# Patient Record
Sex: MALE | Race: WHITE | NOT HISPANIC OR LATINO | Employment: FULL TIME | URBAN - METROPOLITAN AREA
[De-identification: names, ages, dates, MRNs, and addresses within clinical notes are randomized per-mention and may not be internally consistent; named-entity substitution may affect disease eponyms.]

---

## 2023-04-01 ENCOUNTER — HOSPITAL ENCOUNTER (EMERGENCY)
Facility: HOSPITAL | Age: 58
Discharge: HOME | End: 2023-04-01
Attending: EMERGENCY MEDICINE | Admitting: EMERGENCY MEDICINE
Payer: COMMERCIAL

## 2023-04-01 ENCOUNTER — APPOINTMENT (EMERGENCY)
Dept: RADIOLOGY | Facility: HOSPITAL | Age: 58
End: 2023-04-01
Payer: COMMERCIAL

## 2023-04-01 VITALS
SYSTOLIC BLOOD PRESSURE: 137 MMHG | HEART RATE: 80 BPM | OXYGEN SATURATION: 100 % | DIASTOLIC BLOOD PRESSURE: 75 MMHG | TEMPERATURE: 98.7 F | RESPIRATION RATE: 15 BRPM

## 2023-04-01 DIAGNOSIS — R51.9 ACUTE NONINTRACTABLE HEADACHE, UNSPECIFIED HEADACHE TYPE: Primary | ICD-10-CM

## 2023-04-01 DIAGNOSIS — B02.9 HERPES ZOSTER WITHOUT COMPLICATION: ICD-10-CM

## 2023-04-01 LAB
ALBUMIN SERPL-MCNC: 4.1 G/DL (ref 3.4–5)
ALP SERPL-CCNC: 44 IU/L (ref 35–126)
ALT SERPL-CCNC: 27 IU/L (ref 16–63)
ANION GAP SERPL CALC-SCNC: 6 MEQ/L (ref 3–15)
AST SERPL-CCNC: 28 IU/L (ref 15–41)
BASOPHILS # BLD: 0.02 K/UL (ref 0.01–0.1)
BASOPHILS NFR BLD: 0.4 %
BILIRUB SERPL-MCNC: 0.7 MG/DL (ref 0.3–1.2)
BUN SERPL-MCNC: 12 MG/DL (ref 8–20)
CALCIUM SERPL-MCNC: 9.1 MG/DL (ref 8.9–10.3)
CHLORIDE SERPL-SCNC: 105 MEQ/L (ref 98–109)
CO2 SERPL-SCNC: 26 MEQ/L (ref 22–32)
CREAT SERPL-MCNC: 1 MG/DL (ref 0.8–1.3)
DIFFERENTIAL METHOD BLD: ABNORMAL
EOSINOPHIL # BLD: 0.06 K/UL (ref 0.04–0.54)
EOSINOPHIL NFR BLD: 1.1 %
ERYTHROCYTE [DISTWIDTH] IN BLOOD BY AUTOMATED COUNT: 12 % (ref 11.6–14.4)
GFR SERPL CREATININE-BSD FRML MDRD: >60 ML/MIN/1.73M*2
GLUCOSE SERPL-MCNC: 85 MG/DL (ref 70–99)
HCT VFR BLDCO AUTO: 44.3 % (ref 40.1–51)
HGB BLD-MCNC: 15.2 G/DL (ref 13.7–17.5)
IMM GRANULOCYTES # BLD AUTO: 0.01 K/UL (ref 0–0.08)
IMM GRANULOCYTES NFR BLD AUTO: 0.2 %
LYMPHOCYTES # BLD: 0.77 K/UL (ref 1.2–3.5)
LYMPHOCYTES NFR BLD: 14.4 %
MCH RBC QN AUTO: 31.1 PG (ref 28–33.2)
MCHC RBC AUTO-ENTMCNC: 34.3 G/DL (ref 32.2–36.5)
MCV RBC AUTO: 90.8 FL (ref 83–98)
MONOCYTES # BLD: 0.6 K/UL (ref 0.3–1)
MONOCYTES NFR BLD: 11.2 %
NEUTROPHILS # BLD: 3.89 K/UL (ref 1.7–7)
NEUTS SEG NFR BLD: 72.7 %
NRBC BLD-RTO: 0 %
PDW BLD AUTO: 8.9 FL (ref 9.4–12.4)
PLATELET # BLD AUTO: 192 K/UL (ref 150–350)
POTASSIUM SERPL-SCNC: 4.3 MEQ/L (ref 3.6–5.1)
PROT SERPL-MCNC: 7.1 G/DL (ref 6–8.2)
RBC # BLD AUTO: 4.88 M/UL (ref 4.5–5.8)
SODIUM SERPL-SCNC: 137 MEQ/L (ref 136–144)
WBC # BLD AUTO: 5.35 K/UL (ref 3.8–10.5)

## 2023-04-01 PROCEDURE — 70450 CT HEAD/BRAIN W/O DYE: CPT | Mod: MG

## 2023-04-01 PROCEDURE — 99284 EMERGENCY DEPT VISIT MOD MDM: CPT | Mod: 25

## 2023-04-01 PROCEDURE — 85025 COMPLETE CBC W/AUTO DIFF WBC: CPT | Performed by: EMERGENCY MEDICINE

## 2023-04-01 PROCEDURE — 80053 COMPREHEN METABOLIC PANEL: CPT | Performed by: EMERGENCY MEDICINE

## 2023-04-01 PROCEDURE — 36415 COLL VENOUS BLD VENIPUNCTURE: CPT | Performed by: EMERGENCY MEDICINE

## 2023-04-01 RX ORDER — OXYCODONE AND ACETAMINOPHEN 5; 325 MG/1; MG/1
1 TABLET ORAL EVERY 6 HOURS PRN
Qty: 12 TABLET | Refills: 0 | Status: SHIPPED | OUTPATIENT
Start: 2023-04-01 | End: 2023-04-11

## 2023-04-01 ASSESSMENT — ENCOUNTER SYMPTOMS
WHEEZING: 0
DIZZINESS: 0
CHEST TIGHTNESS: 0
COLOR CHANGE: 0
NECK PAIN: 0
SORE THROAT: 0
VOMITING: 0
CHILLS: 0
ABDOMINAL PAIN: 0
ABDOMINAL DISTENTION: 0
BACK PAIN: 0
FEVER: 0
DIARRHEA: 0
LIGHT-HEADEDNESS: 0
WEAKNESS: 0
SEIZURES: 0
NECK STIFFNESS: 0
PSYCHIATRIC NEGATIVE: 1
SPEECH DIFFICULTY: 0
NAUSEA: 0
COUGH: 0
ARTHRALGIAS: 0
TROUBLE SWALLOWING: 0
SHORTNESS OF BREATH: 0
NUMBNESS: 0
AGITATION: 0
PALPITATIONS: 0
HEADACHES: 1

## 2023-04-01 NOTE — ED ATTESTATION NOTE
I have personally seen and examined the patient.  I reviewed and agree with physician assistant / nurse practitioner’s assessment and plan of care.       Exam: Patient is uncomfortable but stable in no acute distress.  His vital signs are normal and he is afebrile.  Heart is regular and there is no respiratory difficulty.  Skin exam is consistent with a thoracic and back rash only on the right side diagnostic for shingles eruption.  Although I did not appreciate a rash on the scalp with the symptoms certainly suggest the lancinating pain is due to potential secondary site that will ultimately demonstrate rash.  Fluorescein staining did not reveal any dendritic lesions in the right eye and there is no evidence for Wall sign.    Plan: Patient is currently on antivirals.  We will treat the patient's pain and check a head CT to rule out intracranial pathology as a cause for his right-sided head and scalp discomfort         Kip Barker,   04/01/23 1242

## 2023-04-01 NOTE — DISCHARGE INSTRUCTIONS
Please call follow-up with your primary care doctor at next available appointment.  Take the antivirals as previously prescribed and take the additional pain medication as needed for pain.  Return here right away if your symptoms were to change get worse or any other concerns.

## 2023-04-01 NOTE — ED PROVIDER NOTES
Emergency Medicine Note  HPI   HISTORY OF PRESENT ILLNESS     HPI   Patient is a 57-year-old male with no real pertinent past medical history that is present emergency room today due to probable shingles with a severe headache.  The patient tells me that he is here working on a bridge and reportedly noticed a little red area and soreness to his right axilla he thought it was just from his harness that he wears reportedly the rashes spread he did a TeleMed appointment with his primary care doctor in [place] and was told he probably had shingles he reports that he started antivirals he has had about 4 doses so far now he is having pain to his right temple area denies any visual changes does not get headaches very frequently but the headache is very sharp and pulsating at times.  No reported trauma patient was concerned so present emergency room for evaluation.        Patient History   PAST HISTORY     Reviewed from Nursing Triage:       History reviewed. No pertinent past medical history.    Past Surgical History:   Procedure Laterality Date   • HERNIA REPAIR         History reviewed. No pertinent family history.    Social History     Tobacco Use   • Smoking status: Never   • Smokeless tobacco: Never   Substance Use Topics   • Alcohol use: Never   • Drug use: Never         Review of Systems   REVIEW OF SYSTEMS     Review of Systems   Constitutional: Negative for chills and fever.   HENT: Negative for sore throat and trouble swallowing.    Respiratory: Negative for cough, chest tightness, shortness of breath and wheezing.    Cardiovascular: Negative for chest pain and palpitations.   Gastrointestinal: Negative for abdominal distention, abdominal pain, diarrhea, nausea and vomiting.   Musculoskeletal: Negative for arthralgias, back pain, neck pain and neck stiffness.   Skin: Positive for rash. Negative for color change.   Neurological: Positive for headaches. Negative for dizziness, seizures, syncope, speech difficulty,  weakness, light-headedness and numbness.   Psychiatric/Behavioral: Negative.  Negative for agitation.   All other systems reviewed and are negative.        VITALS     ED Vitals    Date/Time Temp Pulse Resp BP SpO2 [facility]   04/01/23 1425 -- 80 15 137/75 100 % LMS   04/01/23 1158 37.1 °C (98.7 °F) 86 16 140/88 98 % PMA        Pulse Ox %: 98 % (04/01/23 1334)  Pulse Ox Interpretation: Normal (04/01/23 1334)  Heart Rate: 86 (04/01/23 1334)  Rhythm Strip Interpretation: Normal Sinus Rhythm (04/01/23 1334)     Physical Exam   PHYSICAL EXAM     Physical Exam  Vitals and nursing note reviewed.   Constitutional:       Appearance: Normal appearance.   HENT:      Head: Normocephalic and atraumatic.   Eyes:      Extraocular Movements: Extraocular movements intact.      Pupils: Pupils are equal, round, and reactive to light.      Comments: Fluorescein exam does not reveal any evidence of dendritic lesion on the right.  There is no uptake   Cardiovascular:      Rate and Rhythm: Normal rate and regular rhythm.      Pulses: Normal pulses.      Heart sounds: Normal heart sounds.   Pulmonary:      Effort: Pulmonary effort is normal.      Breath sounds: Normal breath sounds. No wheezing, rhonchi or rales.   Skin:     General: Skin is warm and dry.      Findings: Rash (Patient with pustular rash to his right upper back does not cross the midline over into his right lateral chest wall and right upper anterior chest wall consistent with shingles.  He also has what appears to be a very faint red rash to his right temple are) present.   Neurological:      General: No focal deficit present.      Mental Status: He is alert.           PROCEDURES     Procedures     DATA     Results     Procedure Component Value Units Date/Time    Comprehensive metabolic panel [[id number]]  (Normal) Collected: 04/01/23 1210    Specimen: Blood, Venous Updated: 04/01/23 1251     Sodium 137 mEQ/L      Potassium 4.3 mEQ/L      Comment: Results obtained on plasma.  Plasma Potassium values may be up to 0.4 mEQ/L less than serum values. The differences may be greater for patients with high platelet or white cell counts.        Chloride 105 mEQ/L      CO2 26 mEQ/L      BUN 12 mg/dL      Creatinine 1.0 mg/dL      Glucose 85 mg/dL      Calcium 9.1 mg/dL      AST (SGOT) 28 IU/L      ALT (SGPT) 27 IU/L      Alkaline Phosphatase 44 IU/L      Total Protein 7.1 g/dL      Comment: Test performed on plasma which typically contains approximately 0.4 g/dL more protein than serum.        Albumin 4.1 g/dL      Bilirubin, Total 0.7 mg/dL      eGFR >60.0 mL/min/1.73m*2      Anion Gap 6 mEQ/L     CBC and differential [[id number]]  (Abnormal) Collected: 04/01/23 1210    Specimen: Blood, Venous Updated: 04/01/23 1225     WBC 5.35 K/uL      RBC 4.88 M/uL      Hemoglobin 15.2 g/dL      Hematocrit 44.3 %      MCV 90.8 fL      MCH 31.1 pg      MCHC 34.3 g/dL      RDW 12.0 %      Platelets 192 K/uL      MPV 8.9 fL      Differential Type Auto     nRBC 0.0 %      Immature Granulocytes 0.2 %      Neutrophils 72.7 %      Lymphocytes 14.4 %      Monocytes 11.2 %      Eosinophils 1.1 %      Basophils 0.4 %      Immature Granulocytes, Absolute 0.01 K/uL      Neutrophils, Absolute 3.89 K/uL      Lymphocytes, Absolute 0.77 K/uL      Monocytes, Absolute 0.60 K/uL      Eosinophils, Absolute 0.06 K/uL      Basophils, Absolute 0.02 K/uL           Imaging Results          CT HEAD WITHOUT IV CONTRAST (Final result)  Result time 04/01/23 14:52:52    Final result                 Impression:    IMPRESSION: No acute intracranial abnormality             Narrative:    CLINICAL HISTORY: Headache.    COMMENT:    Noncontrast CT of the brain was performed. Images were reviewed in routine  brain, stroke, subdural, and bony windows.  Sagittal and coronal reformatted  sequences were reviewed.  The kV and/or mA were adjusted according to the  patient's size and body part for CT dose reduction.    There is no evidence of acute  territorial infarct.  There is no evidence of  bleed, mass effect, or midline shift.  There is no extra axial mass or fluid  collection. There is mild mucosal thickening in the sphenoid sinus.                                No orders to display       Scoring tools                                  ED Course & MDM   MDM / ED COURSE / CLINICAL IMPRESSION / DISPO     MDM    ED Course as of 04/01/23 1950   Sat Apr 01, 2023 1949 Patient CT does not show anything acute.  Patient has shingles no eye involvement will be discharged home with follow-up. [TK]      ED Course User Index  [TK] Misael Wagner PA C     Clinical Impression      Acute nonintractable headache, unspecified headache type   Herpes zoster without complication     _________________     ED Disposition   Discharge                   Misael Wagner PA C  04/01/23 1950

## 2024-02-12 ENCOUNTER — NON-APPOINTMENT (OUTPATIENT)
Age: 59
End: 2024-02-12

## 2024-08-25 ENCOUNTER — HOSPITAL ENCOUNTER (EMERGENCY)
Facility: HOSPITAL | Age: 59
Discharge: HOME | End: 2024-08-25
Attending: EMERGENCY MEDICINE | Admitting: EMERGENCY MEDICINE

## 2024-08-25 ENCOUNTER — APPOINTMENT (EMERGENCY)
Dept: RADIOLOGY | Facility: HOSPITAL | Age: 59
End: 2024-08-25

## 2024-08-25 VITALS
HEART RATE: 80 BPM | SYSTOLIC BLOOD PRESSURE: 130 MMHG | OXYGEN SATURATION: 98 % | HEIGHT: 69 IN | RESPIRATION RATE: 15 BRPM | BODY MASS INDEX: 27.4 KG/M2 | DIASTOLIC BLOOD PRESSURE: 80 MMHG | WEIGHT: 185 LBS | TEMPERATURE: 98 F

## 2024-08-25 DIAGNOSIS — S60.459A FOREIGN BODY IN SKIN OF FINGER, INITIAL ENCOUNTER: Primary | ICD-10-CM

## 2024-08-25 PROCEDURE — 99283 EMERGENCY DEPT VISIT LOW MDM: CPT | Mod: 25

## 2024-08-25 PROCEDURE — 10120 INC&RMVL FB SUBQ TISS SMPL: CPT

## 2024-08-25 PROCEDURE — 0JCJ0ZZ EXTIRPATION OF MATTER FROM RIGHT HAND SUBCUTANEOUS TISSUE AND FASCIA, OPEN APPROACH: ICD-10-PCS | Performed by: EMERGENCY MEDICINE

## 2024-08-25 PROCEDURE — 63700000 HC SELF-ADMINISTRABLE DRUG

## 2024-08-25 PROCEDURE — 73140 X-RAY EXAM OF FINGER(S): CPT | Mod: RT

## 2024-08-25 RX ORDER — CEPHALEXIN 500 MG/1
500 CAPSULE ORAL 4 TIMES DAILY
Qty: 27 CAPSULE | Refills: 0 | Status: SHIPPED | OUTPATIENT
Start: 2024-08-25 | End: 2024-09-01

## 2024-08-25 RX ORDER — CEPHALEXIN 500 MG/1
500 CAPSULE ORAL ONCE
Status: COMPLETED | OUTPATIENT
Start: 2024-08-25 | End: 2024-08-25

## 2024-08-25 RX ADMIN — CEPHALEXIN 500 MG: 500 CAPSULE ORAL at 18:06

## 2024-08-25 ASSESSMENT — ENCOUNTER SYMPTOMS
CHILLS: 0
WOUND: 1
WEAKNESS: 0
NUMBNESS: 0
FEVER: 0

## 2024-08-25 NOTE — ED PROVIDER NOTES
Emergency Medicine Note  HPI   HISTORY OF PRESENT ILLNESS     58-year-old male with no significant past medical history who presents for evaluation of right splinter in his right ring finger while working outside yesterday.  States he was trying to sweep rocks off while cleaning off a trailer when a splinter pierced his right fourth digit.  Was able to remove some of the splinter but thinks there is still a piece in there.  Unsure of last tetanus.  Denies fevers, chills, or drainage from the finger.          Patient History   PAST HISTORY     Reviewed from Nursing Triage:       History reviewed. No pertinent past medical history.    Past Surgical History:   Procedure Laterality Date    HERNIA REPAIR         History reviewed. No pertinent family history.    Social History     Tobacco Use    Smoking status: Never    Smokeless tobacco: Never   Substance Use Topics    Alcohol use: Never    Drug use: Never         Review of Systems   REVIEW OF SYSTEMS     Review of Systems   Constitutional:  Negative for chills and fever.   Skin:  Positive for wound.   Neurological:  Negative for weakness and numbness.         VITALS     ED Vitals      Date/Time Temp Pulse Resp BP SpO2 [facility]   08/25/24 1806 36.7 °C (98 °F) 80 15 130/80 98 % SDN   08/25/24 1334 36.7 °C (98 °F) 85 18 134/82 95 % MB          Pulse Ox %: 95 % (08/25/24 1405)  Pulse Ox Interpretation: Normal (08/25/24 1405)           Physical Exam   PHYSICAL EXAM     Physical Exam  Constitutional:       General: He is not in acute distress.     Appearance: He is not ill-appearing, toxic-appearing or diaphoretic.   Cardiovascular:      Rate and Rhythm: Normal rate.   Pulmonary:      Effort: Pulmonary effort is normal. No respiratory distress.   Musculoskeletal:      Comments: Right ring finger with erythema and swelling over the anterior DIP joint.  Small puncture wound right medial fourth digit at the distal phalanx.  Subcutaneous foreign body noted.  Pain with DIP range of  motion.   Neurological:      Mental Status: He is alert.           PROCEDURES     Procedures     DATA     Results       None            Imaging Results              X-RAY FINGER RIGHT 2+ VIEWS (Final result)  Result time 08/25/24 15:14:40      Final result                   Impression:    IMPRESSION: No acute fracture or dislocation in the right fourth digit.  No  obvious foreign body.               Narrative:    CLINICAL HISTORY: Right ring finger splinter.  Evaluate for retained body.    COMPARISON:  Four views of the right hand fourth digit was obtained.    TECHNIQUE:  Four views of the right hand was obtained.  Attention to the right  fourth digit was obtained.    FINDINGS:  No acute fracture or dislocation in the right fourth digit.  There is  degenerative osseous spurring noted adjacent to the right radial aspect of the  fourth digit distal interphalangeal joint.  No soft tissue abnormality.  No  obvious foreign body.                                      No orders to display       Scoring tools                                  ED Course & MDM   MDM / ED COURSE / CLINICAL IMPRESSION / DISPO     Medical Decision Making  58-year-old male with no significant past medical history who presents for evaluation of right splinter in his right ring finger while working outside yesterday.  Removed some of it but still thinks he has a piece in there.  On exam he has right ring finger with erythema and swelling over the anterior DIP joint.  Small puncture wound right medial fourth digit at the distal phalanx.  Subcutaneous foreign body noted.  Pain with DIP range of motion.  X-rays negative for acute fracture, dislocation, or obvious foreign body.  Orthopedic resident on call consulted, they irrigated the wound, made an incision, and removed multiple pieces of the splinter.  The patient was given hand surgery follow-up with Dr. Wheatley in 1 week, Keflex 500 mg 4 times daily x 7 days, and instructions to keep the wound clean  and dry for 2 days.  Vital signs stable, patient agreeable to plan.    Problems Addressed:  Foreign body in skin of finger, initial encounter: acute illness or injury    Amount and/or Complexity of Data Reviewed  Radiology: ordered. Decision-making details documented in ED Course.    Risk  Prescription drug management.        ED Course as of 08/25/24 2242   Sun Aug 25, 2024   1357 Patient refusing tetanus shot. [CM]   1534 X-RAY FINGER RIGHT 2+ VIEWS  IMPRESSION: No acute fracture or dislocation in the right fourth digit.  No  obvious foreign body.   [CM]   1636 Ortho paged [CM]   1645 Ortho repaged now that patient is in room, will come to evaluate patient [CM]      ED Course User Index  [CM] Shakir Henry PA C     Clinical Impression      Foreign body in skin of finger, initial encounter     _________________       ED Disposition   Discharge                       Shakir Henry PA C  08/25/24 2245

## 2024-08-25 NOTE — DISCHARGE INSTRUCTIONS
Take Keflex 1 tablet 500 mg every 6 hours to help treat and prevent infection.  Take Tylenol and Motrin as needed for pain.  Follow-up with the orthopedic hand surgeon in 1 week.  Return to the emergency department with any new, worsening, or other concerning symptoms.

## 2024-08-25 NOTE — ED ATTESTATION NOTE
I have personally seen and examined the patient.  I reviewed and agree with physician assistant / nurse practitioner’s assessment and plan of care, with the following exceptions: None    I personally performed the key components of the encounter and provided a substantive portion of the care and medical decision making    My examination, assessment, and plan of care of Conor Cortez is as follows:     PT felt a wooden splinter go in his right ring finger. He pulled a small piece out of the exit wound.  Exam: right ring finger: entrance wound to mid phal flexor surface with another wound lateral to the nail. Full ROM with some pain     Manoj Cabello MD  08/25/24 4544

## 2024-08-25 NOTE — CONSULTS
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 Orthopaedic surgery was consulted for potential foreign body.      On direct questioning, the patient reports yesterday he was cleaning a trailer with his gloved right hand when a wooden splinter pierced his fourth digit. He removed part of a wooden piece off the radial aspect of his distal phalanx of his fourth digit but today he noticed pain and swelling so he came to the ED for further evaluation. No fevers chills or drainage from finger.     Hand dominance: RHD   Baseline Ambulatory Status: unassisted ambulator    PMH:   History reviewed. No pertinent past medical history.    PSH:   Past Surgical History:   Procedure Laterality Date    HERNIA REPAIR         Medications:   Not in a hospital admission.    Allergies:   No Known Allergies    SHx:  - Living situation: home  - Occupation: industrial    - Tobacco use: denies   - Alcohol use: denies   - Illicit substances: denies     Vital Signs:  Vitals:    08/25/24 1334   BP: 134/82   Pulse: 85   Resp: 18   Temp: 36.7 °C (98 °F)   SpO2: 95%       Labs:  CBC Results         04/01/23     1210    WBC 5.35    RBC 4.88    HGB 15.2    HCT 44.3    MCV 90.8    MCH 31.1    MCHC 34.3              BMP Results         04/01/23     1210        K 4.3    Cl 105    CO2 26    Glucose 85    BUN 12    Creatinine 1.0    Calcium 9.1    Anion Gap 6    EGFR >60.0           Comment for K at 1210 on 04/01/23: Results obtained on plasma. Plasma Potassium values may be up to 0.4 mEQ/L less than serum values. The differences may be greater for patients with high platelet or white cell counts.            Physical Exam:  General:  Awake, following commands  Symmetric chest rise bilaterally with normal  effort    Musculoskeletal:  Right Upper Extremity  Medial entry wound to R fourth digit distal phalanx. No active drainage or fluctuance. Distal and middle phalanx erythematous and swollen. Pain with 4th digit DIP ROM. No pain with PIP or MCP ROM  TTP along 4th digit distal and middle phalanx  Fires deltoid, biceps, triceps, wrist extensors, finger flexors, hand intrinsics  Sensation intact to light touch in axillary, median, radial, ulnar nerve distributions  Palpable radial pulse    Imaging:  XR of R hand with no acute fracture or dislocation    ORTHOPAEDIC SURGERY PROCEDURE NOTE    PROCEDURE: R fourth digit I&D     DIAGNOSIS: R 4th digit foreign body    All risks, benefitis, and alternatives were discussed with patient regarding procedure    All question pertaining to the risks, benefits, and alternatives were addressed    Pt consented to the procedure    8cc of 1% lidocaine was injected as a digital block     Assessment & Plan:  58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 Orthopaedic surgery was consulted for potential foreign body now s/p R fourth digit incision and drainage under local digital block     - removed 1-2 cm wooden splinter. Irrigated with 2 L saline and betadine. Dressed with adaptic, gauze and Coban.  - recommend PO Antibiotics   - can follow up with Dr. Wheately in 1 week   --case & imaging discussed with on call attending     Britton Florence MD  Orthopaedic Surgery

## 2024-08-25 NOTE — Clinical Note
Conor Cortez was seen and treated in our emergency department on 8/25/2024.  Conor Cortez may return to work on 08/28/2024.       If you have any questions or concerns, please don't hesitate to call.      Shakir Henry PA C

## 2024-12-03 ENCOUNTER — HOSPITAL ENCOUNTER (EMERGENCY)
Age: 59
Discharge: HOME | End: 2024-12-04
Payer: COMMERCIAL

## 2024-12-03 VITALS
BODY MASS INDEX: 27.7 KG/M2 | SYSTOLIC BLOOD PRESSURE: 114 MMHG | HEIGHT: 69 IN | RESPIRATION RATE: 17 BRPM | HEART RATE: 76 BPM | DIASTOLIC BLOOD PRESSURE: 80 MMHG | TEMPERATURE: 97.2 F | WEIGHT: 187 LBS | OXYGEN SATURATION: 97 %

## 2024-12-03 DIAGNOSIS — K91.840 SURGICAL WOUND HEMORRHAGE AFTER DENTAL PROCEDURE: Primary | ICD-10-CM

## 2024-12-03 DIAGNOSIS — Z98.818 SURGICAL WOUND HEMORRHAGE AFTER DENTAL PROCEDURE: Primary | ICD-10-CM

## 2024-12-03 PROCEDURE — 96374 THER/PROPH/DIAG INJ IV PUSH: CPT

## 2024-12-03 PROCEDURE — 6360000002 HC RX W HCPCS: Performed by: NURSE PRACTITIONER

## 2024-12-03 PROCEDURE — 99284 EMERGENCY DEPT VISIT MOD MDM: CPT

## 2024-12-03 PROCEDURE — 85610 PROTHROMBIN TIME: CPT

## 2024-12-03 PROCEDURE — 85025 COMPLETE CBC W/AUTO DIFF WBC: CPT

## 2024-12-03 PROCEDURE — 2500000003 HC RX 250 WO HCPCS: Performed by: NURSE PRACTITIONER

## 2024-12-03 RX ORDER — AMOXICILLIN 500 MG/1
CAPSULE ORAL
COMMUNITY

## 2024-12-03 RX ORDER — ONDANSETRON 2 MG/ML
4 INJECTION INTRAMUSCULAR; INTRAVENOUS ONCE
Status: COMPLETED | OUTPATIENT
Start: 2024-12-04 | End: 2024-12-03

## 2024-12-03 RX ORDER — TRANEXAMIC ACID 100 MG/ML
1000 INJECTION, SOLUTION INTRAVENOUS ONCE
Status: COMPLETED | OUTPATIENT
Start: 2024-12-03 | End: 2024-12-03

## 2024-12-03 RX ADMIN — ONDANSETRON 4 MG: 2 INJECTION INTRAMUSCULAR; INTRAVENOUS at 23:57

## 2024-12-03 RX ADMIN — TRANEXAMIC ACID 1000 MG: 100 INJECTION, SOLUTION INTRAVENOUS at 23:50

## 2024-12-03 ASSESSMENT — PAIN DESCRIPTION - ORIENTATION: ORIENTATION: RIGHT;LEFT

## 2024-12-03 ASSESSMENT — PAIN DESCRIPTION - PAIN TYPE: TYPE: ACUTE PAIN

## 2024-12-03 ASSESSMENT — PAIN - FUNCTIONAL ASSESSMENT: PAIN_FUNCTIONAL_ASSESSMENT: 0-10

## 2024-12-03 ASSESSMENT — PAIN DESCRIPTION - DESCRIPTORS: DESCRIPTORS: THROBBING

## 2024-12-03 ASSESSMENT — PAIN DESCRIPTION - FREQUENCY: FREQUENCY: CONTINUOUS

## 2024-12-03 ASSESSMENT — PAIN DESCRIPTION - LOCATION: LOCATION: MOUTH

## 2024-12-03 ASSESSMENT — PAIN SCALES - GENERAL: PAINLEVEL_OUTOF10: 7

## 2024-12-04 LAB
BASOPHILS # BLD: 0.03 K/UL (ref 0–0.2)
BASOPHILS NFR BLD: 0 % (ref 0–2)
EOSINOPHIL # BLD: 0.01 K/UL (ref 0.05–0.5)
EOSINOPHILS RELATIVE PERCENT: 0 % (ref 0–6)
ERYTHROCYTE [DISTWIDTH] IN BLOOD BY AUTOMATED COUNT: 12.1 % (ref 11.5–15)
HCT VFR BLD AUTO: 41.7 % (ref 37–54)
HGB BLD-MCNC: 14.3 G/DL (ref 12.5–16.5)
IMM GRANULOCYTES # BLD AUTO: 0.03 K/UL (ref 0–0.58)
IMM GRANULOCYTES NFR BLD: 0 % (ref 0–5)
INR PPP: 1.2
LYMPHOCYTES NFR BLD: 1.11 K/UL (ref 1.5–4)
LYMPHOCYTES RELATIVE PERCENT: 11 % (ref 20–42)
MCH RBC QN AUTO: 30.1 PG (ref 26–35)
MCHC RBC AUTO-ENTMCNC: 34.3 G/DL (ref 32–34.5)
MCV RBC AUTO: 87.8 FL (ref 80–99.9)
MONOCYTES NFR BLD: 0.6 K/UL (ref 0.1–0.95)
MONOCYTES NFR BLD: 6 % (ref 2–12)
NEUTROPHILS NFR BLD: 82 % (ref 43–80)
NEUTS SEG NFR BLD: 8.06 K/UL (ref 1.8–7.3)
PLATELET # BLD AUTO: 247 K/UL (ref 130–450)
PMV BLD AUTO: 8.7 FL (ref 7–12)
PROTHROMBIN TIME: 13.3 SEC (ref 9.3–12.4)
RBC # BLD AUTO: 4.75 M/UL (ref 3.8–5.8)
WBC OTHER # BLD: 9.8 K/UL (ref 4.5–11.5)

## 2024-12-04 NOTE — ED PROVIDER NOTES
[facility] EMERGENCY DEPARTMENT  ED  Encounter Note  Admit Date/RoomTime: 12/3/2024 11:18 PM  ED Room: [place] [id number]     Independent        HPI: Eric Valdez 58 y.o. male with a past medical history of History reviewed. No pertinent past medical history. presents with a complaint of dental pain. The patient states this pain has been gradual in onset, persistent, moderate in severity and worse today which is what prompted the visit. Pain has not been relieved with any OTC medications.  Patient denies any unilateral facial swelling. Patient is able to handle their own secretions and drink fluids without difficulty. Patient denies any fever. The patient also denies any history of dental trauma. Denies difficulty breathing or swallowing. The location of the pain and appears to be isolated over tooth number 2, 15, and  lower left  # 23, and 19.  Patient presents emergency department with bleeding to extraction sites.  Patient reports that he had multiple teeth extracted today by Dr. Weathers around 12 noon.  Patient reports that since the extraction he has had constant bleeding.  Sometimes he feels as if he is passing clots other times it is only bleeding.  And at times the bleeding is less severe and other times he feels like he is bleeding a lot.  Patient is not on any anticoagulation therapy.  He denies any injury or trauma.       Review of Systems:   Pertinent positives and negatives are stated within HPI, all other systems reviewed and are negative.         --------------------------------------------- PAST HISTORY ---------------------------------------------  Past Medical History:  has no past medical history on file.    Past Surgical History:  has a past surgical history that includes hernia repair and Knee arthroscopy.    Social History:  reports that he has never smoked. He does not have any smokeless tobacco history on file. He reports that he does not drink alcohol

## 2024-12-11 ENCOUNTER — HOSPITAL ENCOUNTER (EMERGENCY)
Age: 59
Discharge: HOME | End: 2024-12-11
Attending: EMERGENCY MEDICINE
Payer: COMMERCIAL

## 2024-12-11 VITALS
BODY MASS INDEX: 27.55 KG/M2 | DIASTOLIC BLOOD PRESSURE: 97 MMHG | WEIGHT: 186 LBS | HEIGHT: 69 IN | HEART RATE: 77 BPM | TEMPERATURE: 97.5 F | SYSTOLIC BLOOD PRESSURE: 140 MMHG | RESPIRATION RATE: 14 BRPM | OXYGEN SATURATION: 100 %

## 2024-12-11 DIAGNOSIS — S61.452A DOG BITE OF LEFT HAND, INITIAL ENCOUNTER: Primary | ICD-10-CM
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
## 2024-12-11 PROCEDURE — 99283 EMERGENCY DEPT VISIT LOW MDM: CPT

## 2024-12-11 ASSESSMENT — LIFESTYLE VARIABLES
HOW MANY STANDARD DRINKS CONTAINING ALCOHOL DO YOU HAVE ON A TYPICAL DAY: 1 OR 2
HOW OFTEN DO YOU HAVE A DRINK CONTAINING ALCOHOL: MONTHLY OR LESS

## 2024-12-11 NOTE — ED PROVIDER NOTES
Shared TK-ED Attending Visit.  CC: No       [facility]  Department of Emergency Medicine   ED  Encounter Note  Admit Date/RoomTime: 2024 11:44 AM  ED Room:     NAME: Eric Valdez  : 1965  MRN: [id number]     Chief Complaint:  Animal Bite (dog bite Mon. to L hand, pts. dog, shot up to date, seen at urgent care.)    History of Present Illness        Eric Valdez is a 58 y.o. old male presenting to the emergency department by private vehicle, for a dog bite to left hand, with multiple puncture wounds which occured 2 day(s) prior to arrival.  Since onset the symptoms have been gradually worsening.  Patient reports that on Monday night his dog became tangled up in the garage and he was trying to tackle him when the dog bit him on the left hand.  He reports his dog is never bitten anyone before and is up-to-date on all vaccines including rabies.  He reports that he also has had his last tetanus shot within the past 10 years that he believes.  He states that he was out of town afterwards and that he went to urgent care for evaluation.  He states that he was given Rocephin IV and was discharged with azithromycin pack.  Patient states that he took a full dose yesterday and 1 pill this morning.  He states that he is presenting as they franca a line on his hand and the redness may be presenting past the line for which she has concerns that the infection is getting worse.  Allergies reviewed and he states he has no allergies.  I did question if he had been on any antibiotics recently.  He reports he actually had instantly finished up Augmentin for 1 week as he had a root canal procedure prior to getting bit.  Patient denies fever, chills, nausea, vomiting, diarrhea, chest pain or shortness of breath.  He is right-handed.  Tetanus Status:  last documented Tdap was 3/22/2014. T             Abnormal Behavior Witnessed:  No.           Geographic Location Where Bitten:  at home